# Patient Record
Sex: FEMALE | Race: ASIAN | Employment: FULL TIME | URBAN - METROPOLITAN AREA
[De-identification: names, ages, dates, MRNs, and addresses within clinical notes are randomized per-mention and may not be internally consistent; named-entity substitution may affect disease eponyms.]

---

## 2019-06-17 ENCOUNTER — HOSPITAL ENCOUNTER (OUTPATIENT)
Dept: PREADMISSION TESTING | Age: 39
Discharge: HOME | End: 2019-06-17
Payer: SELF-PAY

## 2019-06-17 VITALS
SYSTOLIC BLOOD PRESSURE: 114 MMHG | WEIGHT: 116 LBS | BODY MASS INDEX: 21.35 KG/M2 | DIASTOLIC BLOOD PRESSURE: 72 MMHG | HEIGHT: 62 IN | TEMPERATURE: 97.9 F

## 2019-06-17 LAB
ANION GAP SERPL CALC-SCNC: 8 MMOL/L (ref 5–15)
ATRIAL RATE: 58 BPM
BASOPHILS # BLD: 0 K/UL (ref 0–0.1)
BASOPHILS NFR BLD: 0 % (ref 0–1)
BUN SERPL-MCNC: 15 MG/DL (ref 6–20)
BUN/CREAT SERPL: 18 (ref 12–20)
CALCIUM SERPL-MCNC: 8.5 MG/DL (ref 8.5–10.1)
CALCULATED P AXIS, ECG09: 58 DEGREES
CALCULATED R AXIS, ECG10: 71 DEGREES
CALCULATED T AXIS, ECG11: 65 DEGREES
CHLORIDE SERPL-SCNC: 106 MMOL/L (ref 97–108)
CO2 SERPL-SCNC: 24 MMOL/L (ref 21–32)
CREAT SERPL-MCNC: 0.82 MG/DL (ref 0.55–1.02)
DIAGNOSIS, 93000: NORMAL
DIFFERENTIAL METHOD BLD: ABNORMAL
EOSINOPHIL # BLD: 0 K/UL (ref 0–0.4)
EOSINOPHIL NFR BLD: 1 % (ref 0–7)
ERYTHROCYTE [DISTWIDTH] IN BLOOD BY AUTOMATED COUNT: 12.8 % (ref 11.5–14.5)
GLUCOSE SERPL-MCNC: 89 MG/DL (ref 65–100)
HCT VFR BLD AUTO: 38.6 % (ref 35–47)
HGB BLD-MCNC: 12.8 G/DL (ref 11.5–16)
IMM GRANULOCYTES # BLD AUTO: 0 K/UL (ref 0–0.04)
IMM GRANULOCYTES NFR BLD AUTO: 1 % (ref 0–0.5)
LYMPHOCYTES # BLD: 1.9 K/UL (ref 0.8–3.5)
LYMPHOCYTES NFR BLD: 22 % (ref 12–49)
MCH RBC QN AUTO: 33.7 PG (ref 26–34)
MCHC RBC AUTO-ENTMCNC: 33.2 G/DL (ref 30–36.5)
MCV RBC AUTO: 101.6 FL (ref 80–99)
MONOCYTES # BLD: 0.2 K/UL (ref 0–1)
MONOCYTES NFR BLD: 2 % (ref 5–13)
NEUTS SEG # BLD: 6.5 K/UL (ref 1.8–8)
NEUTS SEG NFR BLD: 74 % (ref 32–75)
NRBC # BLD: 0 K/UL (ref 0–0.01)
NRBC BLD-RTO: 0 PER 100 WBC
P-R INTERVAL, ECG05: 146 MS
PLATELET # BLD AUTO: 269 K/UL (ref 150–400)
PMV BLD AUTO: 9.9 FL (ref 8.9–12.9)
POTASSIUM SERPL-SCNC: 4.1 MMOL/L (ref 3.5–5.1)
Q-T INTERVAL, ECG07: 428 MS
QRS DURATION, ECG06: 74 MS
QTC CALCULATION (BEZET), ECG08: 420 MS
RBC # BLD AUTO: 3.8 M/UL (ref 3.8–5.2)
SODIUM SERPL-SCNC: 138 MMOL/L (ref 136–145)
VENTRICULAR RATE, ECG03: 58 BPM
WBC # BLD AUTO: 8.7 K/UL (ref 3.6–11)

## 2019-06-17 PROCEDURE — 93005 ELECTROCARDIOGRAM TRACING: CPT

## 2019-06-17 PROCEDURE — 85025 COMPLETE CBC W/AUTO DIFF WBC: CPT

## 2019-06-17 PROCEDURE — 80048 BASIC METABOLIC PNL TOTAL CA: CPT

## 2019-06-17 RX ORDER — IBUPROFEN 200 MG
200 TABLET ORAL
COMMUNITY
End: 2019-07-15

## 2019-06-17 NOTE — PERIOP NOTES
PATIENT GIVEN SURGICAL SITE INFECTION FAQ HANDOUT AND HAND WASHING TIP SHEET. PREOP INSTRUCTIONS REVIEWED AND PATIENT VERBALIZES UNDERSTANDING OF INSTRUCTIONS. PATIENT HAS BEEN GIVEN THE OPPORTUNITY TO ASK ADDITIONAL QUESTIONS. GAVE PATIENT 2 BOTTLES OF CHG AND INSTRUCTIONS. PATIENT VERBALIZED UNDERSTANDING.

## 2019-07-12 ENCOUNTER — ANESTHESIA EVENT (OUTPATIENT)
Dept: MEDSURG UNIT | Age: 39
End: 2019-07-12
Payer: SELF-PAY

## 2019-07-15 ENCOUNTER — ANESTHESIA (OUTPATIENT)
Dept: MEDSURG UNIT | Age: 39
End: 2019-07-15
Payer: SELF-PAY

## 2019-07-15 ENCOUNTER — HOSPITAL ENCOUNTER (OUTPATIENT)
Age: 39
Setting detail: OUTPATIENT SURGERY
Discharge: HOME | End: 2019-07-15
Attending: PLASTIC SURGERY | Admitting: PLASTIC SURGERY
Payer: SELF-PAY

## 2019-07-15 VITALS
BODY MASS INDEX: 21.35 KG/M2 | HEART RATE: 61 BPM | TEMPERATURE: 97.6 F | WEIGHT: 116 LBS | RESPIRATION RATE: 16 BRPM | SYSTOLIC BLOOD PRESSURE: 102 MMHG | OXYGEN SATURATION: 98 % | DIASTOLIC BLOOD PRESSURE: 78 MMHG | HEIGHT: 62 IN

## 2019-07-15 LAB — HCG UR QL: NEGATIVE

## 2019-07-15 PROCEDURE — 77030002982 HC SUT POLYSRB J&J -A: Performed by: PLASTIC SURGERY

## 2019-07-15 PROCEDURE — 74011250636 HC RX REV CODE- 250/636

## 2019-07-15 PROCEDURE — 77030008467 HC STPLR SKN COVD -B: Performed by: PLASTIC SURGERY

## 2019-07-15 PROCEDURE — 74011000250 HC RX REV CODE- 250: Performed by: PLASTIC SURGERY

## 2019-07-15 PROCEDURE — 77030026438 HC STYL ET INTUB CARD -A: Performed by: ANESTHESIOLOGY

## 2019-07-15 PROCEDURE — 77030020262 HC SOL INJ SOD CL 0.9% 100ML: Performed by: PLASTIC SURGERY

## 2019-07-15 PROCEDURE — 77030032490 HC SLV COMPR SCD KNE COVD -B: Performed by: PLASTIC SURGERY

## 2019-07-15 PROCEDURE — 77030008684 HC TU ET CUF COVD -B: Performed by: ANESTHESIOLOGY

## 2019-07-15 PROCEDURE — 74011250636 HC RX REV CODE- 250/636: Performed by: PLASTIC SURGERY

## 2019-07-15 PROCEDURE — 81025 URINE PREGNANCY TEST: CPT

## 2019-07-15 PROCEDURE — 77030026227 HC FUNL KELLR 2 PCH ALGN -C: Performed by: PLASTIC SURGERY

## 2019-07-15 PROCEDURE — 77030018836 HC SOL IRR NACL ICUM -A: Performed by: PLASTIC SURGERY

## 2019-07-15 PROCEDURE — 74011000250 HC RX REV CODE- 250

## 2019-07-15 PROCEDURE — 76210000035 HC AMBSU PH I REC 1 TO 1.5 HR: Performed by: PLASTIC SURGERY

## 2019-07-15 PROCEDURE — 74011250636 HC RX REV CODE- 250/636: Performed by: ANESTHESIOLOGY

## 2019-07-15 PROCEDURE — 74011250637 HC RX REV CODE- 250/637: Performed by: ANESTHESIOLOGY

## 2019-07-15 PROCEDURE — 76210000046 HC AMBSU PH II REC FIRST 0.5 HR: Performed by: PLASTIC SURGERY

## 2019-07-15 PROCEDURE — 77030012406 HC DRN WND PENRS BARD -A: Performed by: PLASTIC SURGERY

## 2019-07-15 PROCEDURE — 77030002966 HC SUT PDS J&J -A: Performed by: PLASTIC SURGERY

## 2019-07-15 PROCEDURE — 77030011264 HC ELECTRD BLD EXT COVD -A: Performed by: PLASTIC SURGERY

## 2019-07-15 PROCEDURE — 77030031139 HC SUT VCRL2 J&J -A: Performed by: PLASTIC SURGERY

## 2019-07-15 PROCEDURE — 77030011825 HC SUPP SURG PSTOP S2SG -B: Performed by: PLASTIC SURGERY

## 2019-07-15 PROCEDURE — 77030013567 HC DRN WND RESERV BARD -A: Performed by: PLASTIC SURGERY

## 2019-07-15 PROCEDURE — 77030034849: Performed by: PLASTIC SURGERY

## 2019-07-15 PROCEDURE — 76060000065 HC AMB SURG ANES 2.5 TO 3 HR: Performed by: PLASTIC SURGERY

## 2019-07-15 PROCEDURE — 76030000005 HC AMB SURG OR TIME 2.5 TO 3: Performed by: PLASTIC SURGERY

## 2019-07-15 PROCEDURE — 74011000258 HC RX REV CODE- 258: Performed by: PLASTIC SURGERY

## 2019-07-15 PROCEDURE — 77030020263 HC SOL INJ SOD CL0.9% LFCR 1000ML: Performed by: PLASTIC SURGERY

## 2019-07-15 PROCEDURE — 77030018846 HC SOL IRR STRL H20 ICUM -A: Performed by: PLASTIC SURGERY

## 2019-07-15 PROCEDURE — 77030011267 HC ELECTRD BLD COVD -A: Performed by: PLASTIC SURGERY

## 2019-07-15 PROCEDURE — 77030002936 HC SUT MERS J&J -A: Performed by: PLASTIC SURGERY

## 2019-07-15 PROCEDURE — 77030011640 HC PAD GRND REM COVD -A: Performed by: PLASTIC SURGERY

## 2019-07-15 RX ORDER — SODIUM CHLORIDE 0.9 % (FLUSH) 0.9 %
5-40 SYRINGE (ML) INJECTION AS NEEDED
Status: DISCONTINUED | OUTPATIENT
Start: 2019-07-15 | End: 2019-07-15 | Stop reason: HOSPADM

## 2019-07-15 RX ORDER — HYDROMORPHONE HYDROCHLORIDE 2 MG/ML
INJECTION, SOLUTION INTRAMUSCULAR; INTRAVENOUS; SUBCUTANEOUS AS NEEDED
Status: DISCONTINUED | OUTPATIENT
Start: 2019-07-15 | End: 2019-07-15 | Stop reason: HOSPADM

## 2019-07-15 RX ORDER — GLYCOPYRROLATE 0.2 MG/ML
INJECTION INTRAMUSCULAR; INTRAVENOUS AS NEEDED
Status: DISCONTINUED | OUTPATIENT
Start: 2019-07-15 | End: 2019-07-15 | Stop reason: HOSPADM

## 2019-07-15 RX ORDER — SODIUM CHLORIDE 0.9 % (FLUSH) 0.9 %
5-40 SYRINGE (ML) INJECTION EVERY 8 HOURS
Status: DISCONTINUED | OUTPATIENT
Start: 2019-07-15 | End: 2019-07-15 | Stop reason: HOSPADM

## 2019-07-15 RX ORDER — LIDOCAINE HYDROCHLORIDE 10 MG/ML
0.1 INJECTION, SOLUTION EPIDURAL; INFILTRATION; INTRACAUDAL; PERINEURAL AS NEEDED
Status: DISCONTINUED | OUTPATIENT
Start: 2019-07-15 | End: 2019-07-15 | Stop reason: HOSPADM

## 2019-07-15 RX ORDER — FENTANYL CITRATE 50 UG/ML
25 INJECTION, SOLUTION INTRAMUSCULAR; INTRAVENOUS
Status: COMPLETED | OUTPATIENT
Start: 2019-07-15 | End: 2019-07-15

## 2019-07-15 RX ORDER — MIDAZOLAM HYDROCHLORIDE 1 MG/ML
INJECTION, SOLUTION INTRAMUSCULAR; INTRAVENOUS AS NEEDED
Status: DISCONTINUED | OUTPATIENT
Start: 2019-07-15 | End: 2019-07-15 | Stop reason: HOSPADM

## 2019-07-15 RX ORDER — HYDROMORPHONE HYDROCHLORIDE 1 MG/ML
0.2 INJECTION, SOLUTION INTRAMUSCULAR; INTRAVENOUS; SUBCUTANEOUS
Status: DISCONTINUED | OUTPATIENT
Start: 2019-07-15 | End: 2019-07-15 | Stop reason: HOSPADM

## 2019-07-15 RX ORDER — PROPOFOL 10 MG/ML
INJECTION, EMULSION INTRAVENOUS AS NEEDED
Status: DISCONTINUED | OUTPATIENT
Start: 2019-07-15 | End: 2019-07-15 | Stop reason: HOSPADM

## 2019-07-15 RX ORDER — CEFAZOLIN SODIUM 1 G/3ML
2 INJECTION, POWDER, FOR SOLUTION INTRAMUSCULAR; INTRAVENOUS
Status: DISCONTINUED | OUTPATIENT
Start: 2019-07-15 | End: 2019-07-15 | Stop reason: HOSPADM

## 2019-07-15 RX ORDER — DEXAMETHASONE SODIUM PHOSPHATE 4 MG/ML
INJECTION, SOLUTION INTRA-ARTICULAR; INTRALESIONAL; INTRAMUSCULAR; INTRAVENOUS; SOFT TISSUE AS NEEDED
Status: DISCONTINUED | OUTPATIENT
Start: 2019-07-15 | End: 2019-07-15 | Stop reason: HOSPADM

## 2019-07-15 RX ORDER — SODIUM CHLORIDE, SODIUM LACTATE, POTASSIUM CHLORIDE, CALCIUM CHLORIDE 600; 310; 30; 20 MG/100ML; MG/100ML; MG/100ML; MG/100ML
INJECTION, SOLUTION INTRAVENOUS
Status: DISCONTINUED | OUTPATIENT
Start: 2019-07-15 | End: 2019-07-15 | Stop reason: HOSPADM

## 2019-07-15 RX ORDER — BACITRACIN 500 UNIT/G
5 PACKET (EA) TOPICAL 2 TIMES DAILY
Status: DISCONTINUED | OUTPATIENT
Start: 2019-07-15 | End: 2019-07-15 | Stop reason: HOSPADM

## 2019-07-15 RX ORDER — DEXMEDETOMIDINE HYDROCHLORIDE 4 UG/ML
INJECTION, SOLUTION INTRAVENOUS AS NEEDED
Status: DISCONTINUED | OUTPATIENT
Start: 2019-07-15 | End: 2019-07-15 | Stop reason: HOSPADM

## 2019-07-15 RX ORDER — NEOSTIGMINE METHYLSULFATE 1 MG/ML
INJECTION INTRAVENOUS AS NEEDED
Status: DISCONTINUED | OUTPATIENT
Start: 2019-07-15 | End: 2019-07-15 | Stop reason: HOSPADM

## 2019-07-15 RX ORDER — LIDOCAINE HYDROCHLORIDE 20 MG/ML
INJECTION, SOLUTION EPIDURAL; INFILTRATION; INTRACAUDAL; PERINEURAL AS NEEDED
Status: DISCONTINUED | OUTPATIENT
Start: 2019-07-15 | End: 2019-07-15 | Stop reason: HOSPADM

## 2019-07-15 RX ORDER — CEFAZOLIN SODIUM/WATER 2 G/20 ML
2 SYRINGE (ML) INTRAVENOUS ONCE
Status: COMPLETED | OUTPATIENT
Start: 2019-07-15 | End: 2019-07-15

## 2019-07-15 RX ORDER — MORPHINE SULFATE 10 MG/ML
2 INJECTION, SOLUTION INTRAMUSCULAR; INTRAVENOUS
Status: DISCONTINUED | OUTPATIENT
Start: 2019-07-15 | End: 2019-07-15 | Stop reason: HOSPADM

## 2019-07-15 RX ORDER — ONDANSETRON 2 MG/ML
4 INJECTION INTRAMUSCULAR; INTRAVENOUS AS NEEDED
Status: DISCONTINUED | OUTPATIENT
Start: 2019-07-15 | End: 2019-07-15 | Stop reason: HOSPADM

## 2019-07-15 RX ORDER — ROCURONIUM BROMIDE 10 MG/ML
INJECTION, SOLUTION INTRAVENOUS AS NEEDED
Status: DISCONTINUED | OUTPATIENT
Start: 2019-07-15 | End: 2019-07-15 | Stop reason: HOSPADM

## 2019-07-15 RX ORDER — FENTANYL CITRATE 50 UG/ML
INJECTION, SOLUTION INTRAMUSCULAR; INTRAVENOUS AS NEEDED
Status: DISCONTINUED | OUTPATIENT
Start: 2019-07-15 | End: 2019-07-15 | Stop reason: HOSPADM

## 2019-07-15 RX ORDER — ONDANSETRON 2 MG/ML
INJECTION INTRAMUSCULAR; INTRAVENOUS AS NEEDED
Status: DISCONTINUED | OUTPATIENT
Start: 2019-07-15 | End: 2019-07-15 | Stop reason: HOSPADM

## 2019-07-15 RX ORDER — ACETAMINOPHEN 10 MG/ML
INJECTION, SOLUTION INTRAVENOUS AS NEEDED
Status: DISCONTINUED | OUTPATIENT
Start: 2019-07-15 | End: 2019-07-15 | Stop reason: HOSPADM

## 2019-07-15 RX ORDER — SODIUM CHLORIDE, SODIUM LACTATE, POTASSIUM CHLORIDE, CALCIUM CHLORIDE 600; 310; 30; 20 MG/100ML; MG/100ML; MG/100ML; MG/100ML
50 INJECTION, SOLUTION INTRAVENOUS CONTINUOUS
Status: DISCONTINUED | OUTPATIENT
Start: 2019-07-15 | End: 2019-07-15 | Stop reason: HOSPADM

## 2019-07-15 RX ORDER — SCOLOPAMINE TRANSDERMAL SYSTEM 1 MG/1
1 PATCH, EXTENDED RELEASE TRANSDERMAL ONCE
Status: DISCONTINUED | OUTPATIENT
Start: 2019-07-15 | End: 2019-07-15 | Stop reason: HOSPADM

## 2019-07-15 RX ORDER — DIPHENHYDRAMINE HYDROCHLORIDE 50 MG/ML
25 INJECTION, SOLUTION INTRAMUSCULAR; INTRAVENOUS ONCE
Status: COMPLETED | OUTPATIENT
Start: 2019-07-15 | End: 2019-07-15

## 2019-07-15 RX ADMIN — LIDOCAINE HYDROCHLORIDE 100 MG: 20 INJECTION, SOLUTION EPIDURAL; INFILTRATION; INTRACAUDAL; PERINEURAL at 12:18

## 2019-07-15 RX ADMIN — DEXAMETHASONE SODIUM PHOSPHATE 8 MG: 4 INJECTION, SOLUTION INTRA-ARTICULAR; INTRALESIONAL; INTRAMUSCULAR; INTRAVENOUS; SOFT TISSUE at 12:27

## 2019-07-15 RX ADMIN — PROPOFOL 200 MG: 10 INJECTION, EMULSION INTRAVENOUS at 12:18

## 2019-07-15 RX ADMIN — DIPHENHYDRAMINE HYDROCHLORIDE 25 MG: 50 INJECTION, SOLUTION INTRAMUSCULAR; INTRAVENOUS at 17:00

## 2019-07-15 RX ADMIN — DEXMEDETOMIDINE HYDROCHLORIDE 4 MCG: 4 INJECTION, SOLUTION INTRAVENOUS at 12:29

## 2019-07-15 RX ADMIN — Medication 2 G: at 12:24

## 2019-07-15 RX ADMIN — FENTANYL CITRATE 25 MCG: 50 INJECTION, SOLUTION INTRAMUSCULAR; INTRAVENOUS at 15:52

## 2019-07-15 RX ADMIN — ROCURONIUM BROMIDE 20 MG: 10 INJECTION, SOLUTION INTRAVENOUS at 12:57

## 2019-07-15 RX ADMIN — SODIUM CHLORIDE, SODIUM LACTATE, POTASSIUM CHLORIDE, AND CALCIUM CHLORIDE 50 ML/HR: 600; 310; 30; 20 INJECTION, SOLUTION INTRAVENOUS at 11:09

## 2019-07-15 RX ADMIN — FENTANYL CITRATE 25 MCG: 50 INJECTION, SOLUTION INTRAMUSCULAR; INTRAVENOUS at 15:45

## 2019-07-15 RX ADMIN — DEXMEDETOMIDINE HYDROCHLORIDE 2 MCG: 4 INJECTION, SOLUTION INTRAVENOUS at 13:03

## 2019-07-15 RX ADMIN — DEXMEDETOMIDINE HYDROCHLORIDE 4 MCG: 4 INJECTION, SOLUTION INTRAVENOUS at 12:48

## 2019-07-15 RX ADMIN — FENTANYL CITRATE 25 MCG: 50 INJECTION, SOLUTION INTRAMUSCULAR; INTRAVENOUS at 15:25

## 2019-07-15 RX ADMIN — ROCURONIUM BROMIDE 30 MG: 10 INJECTION, SOLUTION INTRAVENOUS at 12:18

## 2019-07-15 RX ADMIN — ACETAMINOPHEN 1000 MG: 10 INJECTION, SOLUTION INTRAVENOUS at 12:27

## 2019-07-15 RX ADMIN — HYDROMORPHONE HYDROCHLORIDE 0.5 MG: 2 INJECTION, SOLUTION INTRAMUSCULAR; INTRAVENOUS; SUBCUTANEOUS at 15:00

## 2019-07-15 RX ADMIN — MIDAZOLAM HYDROCHLORIDE 2 MG: 1 INJECTION, SOLUTION INTRAMUSCULAR; INTRAVENOUS at 12:09

## 2019-07-15 RX ADMIN — FENTANYL CITRATE 50 MCG: 50 INJECTION, SOLUTION INTRAMUSCULAR; INTRAVENOUS at 12:18

## 2019-07-15 RX ADMIN — SODIUM CHLORIDE, SODIUM LACTATE, POTASSIUM CHLORIDE, CALCIUM CHLORIDE: 600; 310; 30; 20 INJECTION, SOLUTION INTRAVENOUS at 12:11

## 2019-07-15 RX ADMIN — FENTANYL CITRATE 25 MCG: 50 INJECTION, SOLUTION INTRAMUSCULAR; INTRAVENOUS at 15:35

## 2019-07-15 RX ADMIN — NEOSTIGMINE METHYLSULFATE 2 MG: 1 INJECTION INTRAVENOUS at 14:55

## 2019-07-15 RX ADMIN — GLYCOPYRROLATE 0.4 MG: 0.2 INJECTION INTRAMUSCULAR; INTRAVENOUS at 14:55

## 2019-07-15 RX ADMIN — ONDANSETRON 4 MG: 2 INJECTION INTRAMUSCULAR; INTRAVENOUS at 14:54

## 2019-07-15 NOTE — DISCHARGE INSTRUCTIONS
DISCHARGE INSTRUCTIONS     Follow-up with Dr. Oleksandr Doan tomorrow  Call  [phone]  At [facility], [address] [place] [address]    Dressing  Keep your dressings in place. You may remove the outer ACE wrap tomorrow but leave your bra and the dressings beneath it on. If you take your bra off to adjust or for other reason, please leave the dressings covering your incision on and replace the bra. Showering  May Shower (Do not take [place] Roxo)  In  72 hours     Keep the operative site dry. Sponge bath only until instructed by your Doctor. Do not let shower water hit your breast area. Activity:  Rest quietly. No heavy lifting. No heavy exercise. No driving. No strenous activity,   No lifting greater than 10 pounds  No Sexual Activity 3 weeks  NO UNDERWIRE BRAS FOR 4 WEEKS  WEAR SURGICAL OR SPORTS BRA WHILE ACTIVE  Ambulate every 3-4 hours when awake  to prevent blood clot formation in legs. Problems  Call [phone] or [phone] for excessive pain, bleeding, fever , chills, excessive redness purple discoloration of chest, difficulty breathing or leg cramps    It is normal to have some pain and tightness of the chest but excessive amounts of pain and swelling particularly on one side only is abnormal, please call if you experience this    Follow UP  Please call [facility] [phone]  to make arrangements from PACU for follow up tomorrow    Medication NOT to take    Do NOT take Advil, Aspirin, or Alleve, Fish Oil, Vitamin E  Do NOT Drink Alcohol  Do NOT Smoke or cosume Nicotine Products or excessive caffeine  Avoid Illicit Drugs    . {Medication reconciliation information is now added to the patient's AVS automatically when it is printed. There is no need to use this SmartLink in discharge instructions.   Highlight this text and delete it to clear this message}                        Breast Enlargement: What to Expect at [address] [place] [address]  In breast enlargement surgery, the doctor makes the breasts larger by putting an implant under the breast tissue or under the chest muscle. An implant is a soft silicone shell filled with a saltwater solution or a gel. After the surgery you will probably feel weak. You may feel sore for 2 to 3 weeks and you'll likely have a lot of swelling. You may have a pulling or stretching feeling in your breast area. You can expect to feel better and stronger each day, although you may need pain medicine for a week or two. You may get tired easily or have less energy than usual. This may last for several weeks after surgery. Most stitches are removed in 5 to 10 days. Your new breasts may feel firmer and look rounder. The skin on your breasts may be numb. It should get better with time. You may have some permanent loss of feeling in the nipple area. After the cut (incision) heals, you can massage the area with a mild lotion, vitamin E, or pure lanolin. Do not use perfumed lotions or any product containing alcohol. The massaging action may lower the chance that tough scar tissue will form around an implant. After several weeks, the scar will soften. This care sheet gives you a general idea about how long it will take for you to recover. But each person recovers at a different pace. Follow the steps below to get better as quickly as possible. How can you care for yourself at home? Activity  · Rest when you feel tired. Getting enough sleep will help you recover. · For about 2 to 3 weeks, avoid lifting anything that would make you strain. This may include heavy grocery bags and milk containers, a heavy briefcase or backpack, cat litter or dog food bags, a vacuum , or a child. Do not lift anything over your head for 2 to 3 weeks. · Ask your doctor when you can drive again. · Ask your doctor when it is okay for you to have sex. · You can take your first shower the day after surgery if you do not have a drain near your incision.  If you have a drain, you can shower the day after it is removed. This is usually in a few days. Do not take a bath or soak in a hot tub for about 4 weeks. · You will probably be able to go back to work or your normal routine in 1 to 2 weeks. This depends on the type of work you do and any further treatment. Diet  · You can eat your normal diet. If your stomach is upset, try bland, low-fat foods like plain rice, broiled chicken, toast, and yogurt. · Drink plenty of fluids (unless your doctor tells you not to). · You may notice that your bowel movements are not regular right after your surgery. This is common. Try to avoid constipation and straining with bowel movements. Take a fiber supplement. If you have not had a bowel movement after a couple of days, take a mild laxative. Medicines  · Take pain medicines exactly as directed. ¨ If the doctor gave you a prescription medicine for pain, take it as prescribed. ¨ If you are not taking a prescription pain medicine, ask your doctor if you can take an over-the-counter medicine. · If you think your pain medicine is making you sick to your stomach:  ¨ Take your medicine after meals (unless your doctor has told you not to). ¨ Ask your doctor for a different pain medicine. · If you were given medicine for nausea, take it as directed. · If your doctor prescribed antibiotics, take them as directed. Do not stop taking them just because you feel better. You need to take the full course of antibiotics. Incision care  · If your doctor gave you specific instructions on how to care for your incision, follow those instructions. · You may be wearing a special bra that holds your bandages in place after the surgery. Your doctor will tell you when you can stop wearing the bra. Your doctor may want you to wear the bra at night as well as during the day for several weeks. · Ask your doctor when it is okay to wear an underwire bra. Consider buying a larger bra.  Your old bra will be too small and can put too much pressure on the healing incision. · If you have strips of tape on your incision, leave the tape on for a week or until it falls off. Or follow your doctor's instructions for removing the tape. · Wash the area daily with warm, soapy water, and pat it dry. Don't use hydrogen peroxide or alcohol, which can slow healing. · Keep the area clean and dry. · Ask your doctor what products, such as aloe vera, to use on the incision to help it heal.  Exercise  · Try to walk each day. Start by walking a little more than you did the day before. Bit by bit, increase the amount you walk. Walking boosts blood flow and helps prevent pneumonia and constipation. · Avoid strenuous activities, such as bicycle riding, jogging, weight lifting, or aerobic exercise, until your doctor says it is okay. · Your doctor will tell you when to begin stretching exercises and normal activities. Ice  · Put ice or a cold pack over your breast for 10 to 20 minutes at a time. Try to do this every 1 to 2 hours for the next 3 days (when you are awake) or until the swelling goes down. Put a thin cloth between the ice and your skin. Other instructions  · You may have one or more drains near your incision. Your doctor will tell you how to take care of them. Follow-up care is a key part of your treatment and safety. Be sure to make and go to all appointments, and call your doctor if you are having problems. It's also a good idea to know your test results and keep a list of the medicines you take. When should you call for help? Call 911 anytime you think you may need emergency care. For example, call if:  · You passed out (lost consciousness). · You have sudden chest pain and shortness of breath, or you cough up blood. Call your doctor now or seek immediate medical care if:  · You have severe pain in your breast area. · You have fluid under the skin or a lot of swelling at the surgery site.   · You are sick to your stomach or cannot keep fluids down. · You have pain that does not get better after you take pain medicine. · You have loose stitches, or your incision comes open. · You bleed through a large bandage over the incision. · You develop a cough. · You have signs of infection, such as:  ¨ Increased pain, swelling, warmth, or redness. ¨ Red streaks leading from the incision. ¨ Pus draining from the incision. ¨ Swollen lymph nodes in your neck, armpits, or groin. ¨ A fever. · You have signs of a blood clot, such as:  ¨ Pain in your calf, back of the knee, thigh, or groin. ¨ Redness and swelling in your leg or groin. Watch closely for any changes in your health, and be sure to contact your doctor if:  · Your breast with an implant gets hard or painful, or the shape changes. · You do not have a bowel movement after taking a laxative or using a suppository. · You feel anxious or depressed or have trouble sleeping. · You are not getting better as expected. Where can you learn more? Go to [employer]  Enter B073 in the search box to learn more about \"Breast Enlargement: What to Expect at Home. \"   © 0954-7577 Healthwise, Incorporated. Care instructions adapted under license by [facility] (which disclaims liability or warranty for this information). This care instruction is for use with your licensed healthcare professional. If you have questions about a medical condition or this instruction, always ask your healthcare professional. [place] [address] any warranty or liability for your use of this information. Content Version: 7.5.15182;  Last Revised: January 28, 2011

## 2019-07-15 NOTE — ANESTHESIA POSTPROCEDURE EVALUATION
Post-Anesthesia Evaluation and Assessment    Patient: Christopher Tineo MRN: [id number]  SSN: [id number]    YOB: 1980  Age: 41 y.o. Sex: female      I have evaluated the patient and they are stable and ready for discharge from the PACU. Cardiovascular Function/Vital Signs  Visit Vitals  /78   Pulse 61   Temp 36.4 °C (97.6 °F)   Resp 16   Ht 5' 2\" (1.575 m)   Wt 52.6 kg (116 lb)   SpO2 98%   BMI 21.22 kg/m²       Patient is status post General anesthesia for Procedure(s):  BILATERAL BREAST AUGMENTATION WITH RIGHT CAPSULORRHAPHY,  MESH RIGHT BREAST. Nausea/Vomiting: None    Postoperative hydration reviewed and adequate. Pain:  Pain Scale 1: Visual (07/15/19 1505)  Pain Intensity 1: 0 (07/15/19 1505)   Managed    Neurological Status:   Neuro (WDL): Within Defined Limits (07/15/19 1505)  Neuro  Neurologic State: Pharmacologically induced (comment) (07/15/19 1505)  LUE Motor Response: Purposeful (07/15/19 1505)  LLE Motor Response: Purposeful (07/15/19 1505)  RUE Motor Response: Purposeful (07/15/19 1505)  RLE Motor Response: Purposeful (07/15/19 1505)   At baseline    Mental Status, Level of Consciousness: Alert and  oriented to person, place, and time    Pulmonary Status:   O2 Device: CO2 nasal cannula (07/15/19 1510)   Adequate oxygenation and airway patent    Complications related to anesthesia: None    Post-anesthesia assessment completed. No concerns    Signed By: Tiffanie Abbasi MD     July 15, 2019              Procedure(s):  BILATERAL BREAST AUGMENTATION WITH RIGHT CAPSULORRHAPHY,  MESH RIGHT BREAST.    general    <BSHSIANPOST>    Vitals Value Taken Time   /70 7/15/2019  4:00 PM   Temp 36.4 °C (97.6 °F) 7/15/2019  3:10 PM   Pulse 62 7/15/2019  4:04 PM   Resp 17 7/15/2019  4:04 PM   SpO2 97 % 7/15/2019  4:04 PM   Vitals shown include unvalidated device data.

## 2019-07-15 NOTE — H&P
Pre-op History and Physical    CC: COSMETIC   HPI: 41y.o. 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 Please see clinic [facility] for full details. Past medical history:   Past Medical History:   Diagnosis Date    Nicotine vapor product user       Past surgical history:   Past Surgical History:   Procedure Laterality Date    HX BREAST AUGMENTATION      HX HEENT      root canal upper,right      Family history:   Family History   Problem Relation Age of Onset    Anesth Problems Neg Hx       Social history:   Social History     Socioeconomic History    Marital status: SINGLE     Spouse name: Not on file    Number of children: Not on file    Years of education: Not on file    Highest education level: Not on file   Occupational History    Not on file   Social Needs    Financial resource strain: Not on file    Food insecurity:     Worry: Not on file     Inability: Not on file    Transportation needs:     Medical: Not on file     Non-medical: Not on file   Tobacco Use    Smoking status: Former Smoker     Packs/day: 0.50     Years: 10.00     Pack years: 5.00     Last attempt to quit: 2009     Years since quittin.9    Smokeless tobacco: Never Used   Substance and Sexual Activity    Alcohol use:  Yes     Alcohol/week: 8.4 oz     Types: 14 Glasses of wine per week     Comment: ocassionally    Drug use: No    Sexual activity: Not on file   Lifestyle    Physical activity:     Days per week: Not on file     Minutes per session: Not on file    Stress: Not on file   Relationships    Social connections:     Talks on phone: Not on file     Gets together: Not on file     Attends Holiness service: Not on file     Active member of club or organization: Not on file     Attends meetings of clubs or organizations: Not on file     Relationship status: Not on file    Intimate partner violence:     Fear of current or ex partner: Not on file     Emotionally abused: Not on file     Physically abused: Not on file     Forced sexual activity: Not on file   Other Topics Concern    Not on file   Social History Narrative    Not on file      Home Medications:   Prior to Admission medications    Medication Sig Start Date End Date Taking? Authorizing Provider   ibuprofen (ADVIL) 200 mg tablet Take 200 mg by mouth every six (6) hours as needed for Pain. Yes Provider, Historical   Estradiol Valerate-Dienogest (NATAZIA) 3 mg/2 mg-2 mg/ 2 mg-3 mg/1 mg Tab Take 3 mg by mouth. Yes Provider, Historical      Allergies: Allergies   Allergen Reactions    Sulfa (Sulfonamide Antibiotics) Hives and Itching      Review of systems:  Denies headache, fever, chills, weight change, congestion, sore throat, chest pain, shortness of breath, nausea, vomiting, diarrhea, constipation, abdominal pain, generalized weakness, muscle or joint pain, and rash. Physical Exam:  Vitals: Blood pressure 116/77, pulse 68, temperature 98.5 °F (36.9 °C), resp. rate 16, height 5' 2\" (1.575 m), weight 52.6 kg (116 lb), last menstrual period 06/24/2019, SpO2 99 %.    General: awake and alert, NAD  Neck: supple  Cor: RRR  Lungs: clear  Abdomen: soft, non-tender, non-distended  Extremities: no edema  Breast: bilateral implants soft, right breast with fold malposition and inferior malposition of the implant   Skin:   HEENT:      Impression: COSMETIC    Plan:  Procedure(s):  BILATERAL BREAST AUGMENTATION WITH RIGHT CAPSULORRHAPHY, POSSIBLE MESH

## 2019-07-15 NOTE — ROUTINE PROCESS
Patient: Mathew Mckeon MRN: [id number]  SSN: [id number] YOB: 1980  Age: 41 y.o. Sex: female Patient is status post Procedure(s): BILATERAL BREAST AUGMENTATION WITH RIGHT CAPSULORRHAPHY, POSSIBLE MESH. Surgeon(s) and Role: Obie Bernard MD - Primary Local/Dose/Irrigation:  SEE MAR Airway - Endotracheal Tube 07/15/19 Oral (Active) Dressing/Packing:      
Splint/Cast:  ] Other:

## 2019-07-15 NOTE — ANESTHESIA PREPROCEDURE EVALUATION
Relevant Problems   No relevant active problems       Anesthetic History   No history of anesthetic complications            Review of Systems / Medical History  Patient summary reviewed, nursing notes reviewed and pertinent labs reviewed    Pulmonary  Within defined limits                 Neuro/Psych   Within defined limits           Cardiovascular                  Exercise tolerance: >4 METS     GI/Hepatic/Renal  Within defined limits              Endo/Other  Within defined limits           Other Findings              Physical Exam    Airway  Mallampati: I  TM Distance: > 6 cm  Neck ROM: normal range of motion   Mouth opening: Normal     Cardiovascular    Rhythm: regular  Rate: normal         Dental  No notable dental hx       Pulmonary  Breath sounds clear to auscultation               Abdominal         Other Findings            Anesthetic Plan    ASA: 1  Anesthesia type: general          Induction: Intravenous  Anesthetic plan and risks discussed with: Patient

## 2019-07-15 NOTE — BRIEF OP NOTE
BRIEF OPERATIVE NOTE    Date of Procedure: 7/15/2019   Preoperative Diagnosis: COSMETIC  Postoperative Diagnosis: COSMETIC    Procedure(s):  BILATERAL BREAST AUGMENTATION WITH RIGHT CAPSULORRHAPHY,  MESH RIGHT BREAST  Surgeon(s) and Role:     Alma Ludwig MD - Primary         Surgical Assistant: none    Surgical Staff:  Circ-1: Ricardo Dumont RN  Scrub Tech-1: Ford LYONS  Surg Asst-1: Tres OGLESBY  Event Time In Time Out   Incision Start 1244    Incision Close 1452      Anesthesia: General   Estimated Blood Loss: 50  Specimens:   ID Type Source Tests Collected by Time Destination   1 : BILATERAL REMOVED IMPLANTS DISCARDED BY Edwige Graves MD 7/15/2019 1341 Discarded      Findings: none   Complications: none  Implants:   Implant Name Type Inv.  Item Serial No.  Lot No. LRB No. Used Action   GALATEA SURGICAL SCAFFOLD   NA  [id number] Right 1 Implanted   BREAST   [id number]  NA Left 1 Implanted   BREAST   [id number]  NA Right 1 Implanted

## 2019-07-16 NOTE — OP NOTES
[facility]  OPERATIVE REPORT    Name:  Anastacia Lee  MR#:  [id number]  :  1980  ACCOUNT #:  [de-identified]  DATE OF SERVICE:  07/15/2019    LOCATION:  [place] [address].:  Plastic Surgery. PREOPERATIVE DIAGNOSIS:  Prior breast implantation with malposition of right breast implant. POSTOPERATIVE DIAGNOSIS:  Prior breast implantation with malposition of right breast implant. PROCEDURE PERFORMED:  Bilateral implant exchange with capsulorrhaphy of right breast and mesh placement. SURGEON:  Casey St MD    ASSISTANT:  lara    ANESTHESIA:  General anesthesia. COMPLICATIONS:  None. SPECIMENS REMOVED:  none    IMPLANTS:  Bilateral Natrelle Allergan smooth round implants, SOF-365 bilaterally, serial [id number] on left, serial #[id number] on right. MESH:  GalaFLEX 3D, 5.3 cm x 15 cm, reference #[id number], lot [id number]. ESTIMATED BLOOD LOSS:  50 mL. DISPOSITION:  Stable to PACU. DRAINS:  None. WEST:  None. BRIEF INDICATION:  A 64-year-old female with prior silicone breast augmentation with 325 mL implants. She would like it to be slightly bigger and felt that the right implant has descended past to the fold. Otherwise, unremarkable medical history and physical exam.  She was marked in the preoperative area for prior incision with a new IMF fold location for mesh on the left side and fold location on the right. Informed consent was obtained including, but not limited to bleeding, infection, seroma, hematoma, PE, DVT, asymmetry, implant infection, need for MRIs after silicone breast surgery as well as increased risk for large cell anaplastic lymphoma. PROCEDURE:  The patient underwent general anesthesia in supine position. Breasts and chest were prepped and draped in a sterile surgical fashion. Bilateral prior incisions were opened up after injecting with a solution of Marcaine, lidocaine, and epinephrine.   Dissection was carried out to the breast capsules, and capsules opened bilaterally revealing a moderate +478 silicone implants bilaterally. These were removed. A size was tried on the left side with a  mL implant on the left. On the right, an inferior capsulorrhaphy was performed with a running 2-0 Mersilene suture, one placed medially and one placed laterally. Other sizes were placed in the breast and checked for symmetry of the breasts. The patient was sat up to do this. After this was removed, a galaform 3D mesh was inserted on the right breast to add further support. This was secured in place medially with a 2-0 PDS suture that was run along the IMF fold. Additional tacking sutures were placed from the superior aspect nearly to the breast parenchyma with 2-0 PDS sutures. The pockets were irrigated with bacitracin solution. The gloves were changed, and new permanent implants were opened and inserted with a Swan funnel bilaterally. Closure was then performed with 3-0 PDS sutures in the Adelfo fascial layer, 3-0 Monocryl sutures in deep dermis, and running 3-0 Monocryl sutures. Steri-Strips were applied as well as Tegaderm. The patient was awoken from anesthesia and transported to PACU for further recovery.       Viviane Jamison MD      [id number]  D:  07/15/2019 15:15  T:  07/15/2019 18:04  JOB #:  [id number]

## (undated) DEVICE — SUTURE VCRL SZ 4-0 L18IN ABSRB UD L19MM PS-2 3/8 CIR PRIM J496H

## (undated) DEVICE — TRAY CATH 16F DRN BG LTX -- CONVERT TO ITEM 363158

## (undated) DEVICE — SUTURE VCRL SZ 2-0 L27IN ABSRB UD L26MM CT-2 1/2 CIR J269H

## (undated) DEVICE — INSULATED BLADE ELECTRODE: Brand: EDGE

## (undated) DEVICE — 3M™ TEGADERM™ TRANSPARENT FILM DRESSING FRAME STYLE, 1626W, 4 IN X 4-3/4 IN (10 CM X 12 CM), 50/CT 4CT/CASE: Brand: 3M™ TEGADERM™

## (undated) DEVICE — SYR 10ML LUER LOK 1/5ML GRAD --

## (undated) DEVICE — Z DISCONTINUEDSOLUTION PREP 2OZ 10% POVIDONE IOD SCR CAP BTL

## (undated) DEVICE — STRIP,CLOSURE,WOUND,MEDI-STRIP,1/2X4: Brand: MEDLINE

## (undated) DEVICE — ROCKER SWITCH PENCIL BLADE ELECTRODE, HOLSTER: Brand: EDGE

## (undated) DEVICE — Device

## (undated) DEVICE — SYR 50ML LR LCK 1ML GRAD NSAF --

## (undated) DEVICE — INTENDED FOR TISSUE SEPARATION, AND OTHER PROCEDURES THAT REQUIRE A SHARP SURGICAL BLADE TO PUNCTURE OR CUT.: Brand: BARD-PARKER ® CARBON RIB-BACK BLADES

## (undated) DEVICE — INFECTION CONTROL KIT SYS

## (undated) DEVICE — TRAY PREP DRY W/ PREM GLV 2 APPL 6 SPNG 2 UNDPD 1 OVERWRAP

## (undated) DEVICE — DRAIN SURG W7MMXL20CM SIL FULL PERF HUBLESS FLAT RADPQ STRP

## (undated) DEVICE — GRADUATED BOWL: Brand: DEVON

## (undated) DEVICE — SLIM BODY SKIN STAPLER: Brand: APPOSE ULC

## (undated) DEVICE — MASTISOL ADHESIVE LIQ 2/3ML

## (undated) DEVICE — BRA SURG POST-OP LG 42IN --

## (undated) DEVICE — KENDALL SCD EXPRESS SLEEVES, KNEE LENGTH, MEDIUM: Brand: KENDALL SCD

## (undated) DEVICE — SUTURE VCRL SZ 5-0 L18IN ABSRB UD L13MM P-3 3/8 CIR PRIM J493G

## (undated) DEVICE — SOLUTION IRRIG 1000ML H2O STRL BLT

## (undated) DEVICE — Z DISCONTINUED USE (MFG CAT 7984-37) SOLUTION IV SODIUM CHL 0.9% 100 ML INJ

## (undated) DEVICE — SYR LR LCK 1ML GRAD NSAF 30ML --

## (undated) DEVICE — SOLUTION IV 1000ML 0.9% SOD CHL

## (undated) DEVICE — SUTURE PDS II SZ 2-0 L27IN ABSRB VLT L26MM CT-2 1/2 CIR Z333H

## (undated) DEVICE — SPONGE GZ W4XL4IN COT 12 PLY TYP VII WVN C FLD DSGN

## (undated) DEVICE — STRIP SKIN CLSR W1XL5IN NYL REINF CURAD

## (undated) DEVICE — PACK,BASIC,SIRUS,V: Brand: MEDLINE

## (undated) DEVICE — REM POLYHESIVE ADULT PATIENT RETURN ELECTRODE: Brand: VALLEYLAB

## (undated) DEVICE — SUTURE PDS II SZ 3-0 L27IN ABSRB CLR SH L26MM 1/2 CIR TAPR Z416H

## (undated) DEVICE — PACK,ORTOHMAX/CVMAX,UNIVERSAL,5/CS: Brand: MEDLINE

## (undated) DEVICE — TOWEL SURG W17XL27IN STD BLU COT NONFENESTRATED PREWASHED

## (undated) DEVICE — SYSTEM IMPL DEL FOR BRST IMPL FUN (SEE COMMENT)

## (undated) DEVICE — SURGICAL PROCEDURE PACK BASIN MAJ SET CUST NO CAUT

## (undated) DEVICE — HANDLE LT SNAP ON ULT DURABLE LENS FOR TRUMPF ALC DISPOSABLE

## (undated) DEVICE — BLADE ELECTRODE: Brand: EDGE

## (undated) DEVICE — 1200 GUARD II KIT W/5MM TUBE W/O VAC TUBE: Brand: GUARDIAN

## (undated) DEVICE — NEEDLE HYPO 25GA L1.5IN BVL ORIENTED ECLIPSE